# Patient Record
Sex: MALE | URBAN - METROPOLITAN AREA
[De-identification: names, ages, dates, MRNs, and addresses within clinical notes are randomized per-mention and may not be internally consistent; named-entity substitution may affect disease eponyms.]

---

## 2022-07-26 ENCOUNTER — TELEPHONE (OUTPATIENT)
Dept: INTERNAL MEDICINE CLINIC | Age: 22
End: 2022-07-26

## 2022-07-26 NOTE — TELEPHONE ENCOUNTER
----- Message from Darryle Drafts sent at 7/25/2022 10:35 AM EDT -----  Subject: Appointment Request    Reason for Call: New Patient/New to Provider Appointment needed: New   Patient Request Appointment    QUESTIONS    Reason for appointment request? No appointments available during search     Additional Information for Provider? PT would like to establish with the   requested pcp.  He has a back issue that he would like to be seen by a   orthopedic surgeon   ---------------------------------------------------------------------------  --------------  [id number]  [phone]; OK to leave message on voicemail  ---------------------------------------------------------------------------  --------------  SCRIPT ANSWERS  COVID Screen: Judy Hudson

## 2024-07-25 ENCOUNTER — TELEPHONE (OUTPATIENT)
Facility: CLINIC | Age: 24
End: 2024-07-25

## 2024-07-25 NOTE — TELEPHONE ENCOUNTER
----- Message from Kiya Canales sent at 7/25/2024 11:55 AM EDT -----  Regarding: [facility] Appointment Request  [facility] Appointment Request    Patient needs appointment for [facility] Appointment Type: New Patient.    Patient Requested Dates(s):AS SOON AS POSSIBLE  Patient Requested Time:ANY TIME  Provider Name:ANY PROVIDER THAT IS ACCEPTING A NEW PATIENT.    Reason for Appointment Request: New Patient - Available appointments did not meet patient need the patient is a new patient and in need for ED follow up within 2-3 days. Please call back the patient/patient's girlfriend for further assistance. Thank you    ED FOLLOW UP  WENT TO HOSPITAL YESTERDAY,RELEASED YESTERDAY.  --------------------------------------------------------------------------------------------------------------------------    Relationship to Patient: Spouse/Partner girlfriend NAVI  KELLISON  Call Back Information: OK to leave message on voicemail  Preferred Call Back Number: Phone [phone]